# Patient Record
Sex: FEMALE
[De-identification: names, ages, dates, MRNs, and addresses within clinical notes are randomized per-mention and may not be internally consistent; named-entity substitution may affect disease eponyms.]

---

## 2019-09-26 ENCOUNTER — APPOINTMENT (OUTPATIENT)
Dept: ULTRASOUND IMAGING | Facility: CLINIC | Age: 59
End: 2019-09-26
Payer: COMMERCIAL

## 2019-09-26 PROCEDURE — 76705 ECHO EXAM OF ABDOMEN: CPT

## 2020-12-08 PROBLEM — Z00.00 ENCOUNTER FOR PREVENTIVE HEALTH EXAMINATION: Status: ACTIVE | Noted: 2020-12-08

## 2021-03-08 ENCOUNTER — APPOINTMENT (OUTPATIENT)
Dept: OBGYN | Facility: CLINIC | Age: 61
End: 2021-03-08
Payer: COMMERCIAL

## 2021-03-08 VITALS
BODY MASS INDEX: 28.25 KG/M2 | HEIGHT: 67 IN | TEMPERATURE: 97.9 F | WEIGHT: 180 LBS | DIASTOLIC BLOOD PRESSURE: 78 MMHG | SYSTOLIC BLOOD PRESSURE: 116 MMHG

## 2021-03-08 DIAGNOSIS — Z12.39 ENCOUNTER FOR OTHER SCREENING FOR MALIGNANT NEOPLASM OF BREAST: ICD-10-CM

## 2021-03-08 DIAGNOSIS — L90.0 LICHEN SCLEROSUS ET ATROPHICUS: ICD-10-CM

## 2021-03-08 DIAGNOSIS — Z12.4 ENCOUNTER FOR SCREENING FOR MALIGNANT NEOPLASM OF CERVIX: ICD-10-CM

## 2021-03-08 DIAGNOSIS — N95.2 POSTMENOPAUSAL ATROPHIC VAGINITIS: ICD-10-CM

## 2021-03-08 DIAGNOSIS — Z11.51 ENCOUNTER FOR SCREENING FOR HUMAN PAPILLOMAVIRUS (HPV): ICD-10-CM

## 2021-03-08 DIAGNOSIS — Z01.419 ENCOUNTER FOR GYNECOLOGICAL EXAMINATION (GENERAL) (ROUTINE) W/OUT ABNORMAL FINDINGS: ICD-10-CM

## 2021-03-08 PROCEDURE — 99072 ADDL SUPL MATRL&STAF TM PHE: CPT

## 2021-03-08 PROCEDURE — 99386 PREV VISIT NEW AGE 40-64: CPT

## 2021-03-08 RX ORDER — ESTRADIOL 0.1 MG/G
0.1 CREAM VAGINAL
Qty: 1 | Refills: 3 | Status: ACTIVE | COMMUNITY
Start: 2021-03-08 | End: 1900-01-01

## 2021-03-09 PROBLEM — L90.0 LICHEN SCLEROSUS: Status: ACTIVE | Noted: 2021-03-09

## 2021-03-09 PROBLEM — N95.2 POST-MENOPAUSE ATROPHIC VAGINITIS: Status: ACTIVE | Noted: 2021-03-08

## 2021-03-09 NOTE — PHYSICAL EXAM
[Appropriately responsive] : appropriately responsive [Alert] : alert [No Acute Distress] : no acute distress [Soft] : soft [Non-tender] : non-tender [No Lesions] : no lesions [No Mass] : no mass [Oriented x3] : oriented x3 [Examination Of The Breasts] : a normal appearance [No Masses] : no breast masses were palpable [Atrophy] : atrophy [No Bleeding] : There was no active vaginal bleeding [Normal] : normal [Uterine Adnexae] : normal [FreeTextEntry3] : mobile thyroid, no masses, no nodules [FreeTextEntry6] : No cervical or axillary lymphadenopathy. [FreeTextEntry1] : Lichen sclerosis symmetrically of labia minora with excoriation and scarring of the clitoris.

## 2021-03-09 NOTE — DISCUSSION/SUMMARY
[FreeTextEntry1] : 59 y/o para 2\par gyn annual\par -pap w/ hpv\par -MMG and breast US script\par -con't care w/ pcp\par \par lichen sclerosis\par vaginal atrophy causing dyspareunia\par -recommend estrace 2 gm daily for two weeks applied vaginally and vulvar; then Twice weekly\par -discussed r/b/a\par -patient may return to using clobetasol Twice weekly for 2 weeks on alternative days\par -vulvar care discussed\par -discussed repeat biopsy if sx fail to resolve or improve\par \par rto 3 mos f/u vulvar exam

## 2021-03-09 NOTE — HISTORY OF PRESENT ILLNESS
[Patient reported mammogram was normal] : Patient reported mammogram was normal [N] : Patient is not sexually active [Y] : Positive pregnancy history [Menarche Age: ____] : age at menarche was [unfilled] [Currently In Menopause] : currently in menopause [Previously active] : previously active [No] : No [Patient refuses STI testing] : Patient refuses STI testing [Patient reported breast sonogram was normal] : Patient reported breast sonogram was normal [Patient reported PAP Smear was normal] : Patient reported PAP Smear was normal [Patient reported bone density results were normal] : Patient reported bone density results were normal [Mammogramdate] : 03/2020 [TextBox_19] : normal per pt [BreastSonogramDate] : 03/2020 [TextBox_25] : normal per pt [PapSmeardate] : 03/2020 [TextBox_31] : normal per pt [BoneDensityDate] : 2 years ago [LMPDate] : 10 years go [PGHxTotal] : 2 [BannerxFullTerm] : 2 [Page HospitalxLiving] : 2 [FreeTextEntry1] : 10 years ago

## 2021-03-12 LAB
CYTOLOGY CVX/VAG DOC THIN PREP: ABNORMAL
HPV HIGH+LOW RISK DNA PNL CVX: NOT DETECTED

## 2021-04-16 ENCOUNTER — NON-APPOINTMENT (OUTPATIENT)
Age: 61
End: 2021-04-16

## 2021-04-22 DIAGNOSIS — N60.01 SOLITARY CYST OF RIGHT BREAST: ICD-10-CM

## 2024-07-11 ENCOUNTER — NON-APPOINTMENT (OUTPATIENT)
Age: 64
End: 2024-07-11

## 2024-10-10 ENCOUNTER — TELEPHONE (OUTPATIENT)
Dept: DERMATOLOGY | Facility: CLINIC | Age: 64
End: 2024-10-10

## 2024-10-10 NOTE — TELEPHONE ENCOUNTER
Caller would like to discuss appointment     Patient: June Bryant    Callback Number: [phone]    Best Availability: anytime    Additional Info: Pt called stating she would like to schedule a new patient appt in derm. Pt was notified of how far out we are booking at this time.    I advised caller of a callback by 24 hours.

## 2024-10-21 NOTE — TELEPHONE ENCOUNTER
Called and spoke to patient, per patient scheduling with [facility] took to long and patient has requested services elsewhere.

## 2024-12-29 ENCOUNTER — HOSPITAL ENCOUNTER (EMERGENCY)
Facility: HOSPITAL | Age: 64
Discharge: HOME | End: 2024-12-29
Attending: EMERGENCY MEDICINE
Payer: COMMERCIAL

## 2024-12-29 ENCOUNTER — APPOINTMENT (OUTPATIENT)
Dept: RADIOLOGY | Facility: HOSPITAL | Age: 64
End: 2024-12-29
Payer: COMMERCIAL

## 2024-12-29 VITALS
WEIGHT: 181 LBS | TEMPERATURE: 97.1 F | HEART RATE: 60 BPM | HEIGHT: 68 IN | RESPIRATION RATE: 20 BRPM | OXYGEN SATURATION: 91 % | DIASTOLIC BLOOD PRESSURE: 47 MMHG | SYSTOLIC BLOOD PRESSURE: 109 MMHG | BODY MASS INDEX: 27.43 KG/M2

## 2024-12-29 DIAGNOSIS — R00.2 PALPITATIONS: Primary | ICD-10-CM

## 2024-12-29 LAB
ALBUMIN SERPL-MCNC: 4.3 G/DL (ref 3.5–5.3)
ALP SERPL-CCNC: 74 U/L (ref 50–130)
ALT SERPL-CCNC: 21 U/L (ref 7–52)
ANION GAP SERPL CALC-SCNC: 6 MMOL/L (ref 3–11)
AST SERPL-CCNC: 24 U/L
BASOPHILS # BLD AUTO: 0 10*3/UL
BASOPHILS NFR BLD AUTO: 0.4 % (ref 0–2)
BILIRUB SERPL-MCNC: 0.41 MG/DL (ref 0.2–1.4)
BUN SERPL-MCNC: 18 MG/DL (ref 7–25)
CALCIUM ALBUM COR SERPL-MCNC: 9.4 MG/DL (ref 8.6–10.3)
CALCIUM SERPL-MCNC: 9.6 MG/DL (ref 8.6–10.3)
CHLORIDE SERPL-SCNC: 105 MMOL/L (ref 98–107)
CO2 SERPL-SCNC: 27 MMOL/L (ref 21–32)
CREAT SERPL-MCNC: 1.1 MG/DL (ref 0.6–1.1)
EGFRCR SERPLBLD CKD-EPI 2021: 56 ML/MIN/1.73M*2
EOSINOPHIL # BLD AUTO: 0.2 10*3/UL
EOSINOPHIL NFR BLD AUTO: 2.6 % (ref 0–3)
ERYTHROCYTE [DISTWIDTH] IN BLOOD BY AUTOMATED COUNT: 13.5 % (ref 11.5–14)
GLUCOSE SERPL-MCNC: 90 MG/DL (ref 70–105)
HCT VFR BLD AUTO: 41.8 % (ref 34–45)
HGB BLD-MCNC: 14 G/DL (ref 11.5–15.5)
LYMPHOCYTES # BLD AUTO: 1 10*3/UL
LYMPHOCYTES NFR BLD AUTO: 16.6 % (ref 11–47)
MCH RBC QN AUTO: 27.7 PG (ref 28–33)
MCHC RBC AUTO-ENTMCNC: 33.4 G/DL (ref 32–36)
MCV RBC AUTO: 82.9 FL (ref 81–97)
MONOCYTES # BLD AUTO: 0.5 10*3/UL
MONOCYTES NFR BLD AUTO: 7.8 % (ref 3–11)
NEUTROPHILS # BLD AUTO: 4.4 10*3/UL
NEUTROPHILS NFR BLD AUTO: 72.6 % (ref 41–81)
PLATELET # BLD AUTO: 291 10*3/UL (ref 140–350)
PMV BLD AUTO: 7.5 FL (ref 6.9–10.8)
POTASSIUM SERPL-SCNC: 4.1 MMOL/L (ref 3.5–5.1)
PROT SERPL-MCNC: 7.1 G/DL (ref 6–8.3)
RBC # BLD AUTO: 5.05 10*6/UL (ref 3.7–5.3)
SODIUM SERPL-SCNC: 138 MMOL/L (ref 135–145)
TROPONIN I SERPL-MCNC: 2.8 PG/ML
WBC # BLD AUTO: 6 10*3/UL (ref 4.5–10.5)

## 2024-12-29 PROCEDURE — 36415 COLL VENOUS BLD VENIPUNCTURE: CPT | Performed by: EMERGENCY MEDICINE

## 2024-12-29 PROCEDURE — 99284 EMERGENCY DEPT VISIT MOD MDM: CPT | Performed by: EMERGENCY MEDICINE

## 2024-12-29 PROCEDURE — 84484 ASSAY OF TROPONIN QUANT: CPT | Performed by: EMERGENCY MEDICINE

## 2024-12-29 PROCEDURE — 71045 X-RAY EXAM CHEST 1 VIEW: CPT

## 2024-12-29 PROCEDURE — 85025 COMPLETE CBC W/AUTO DIFF WBC: CPT | Performed by: EMERGENCY MEDICINE

## 2024-12-29 PROCEDURE — 93005 ELECTROCARDIOGRAM TRACING: CPT

## 2024-12-29 PROCEDURE — 80053 COMPREHEN METABOLIC PANEL: CPT | Performed by: EMERGENCY MEDICINE

## 2024-12-30 NOTE — DISCHARGE INSTRUCTIONS
Continue all medications.  Follow-up with your primary physician and cardiology.  Return if you have worsening or new concerning symptoms.

## 2024-12-30 NOTE — ED PROVIDER NOTES
Chief Complaint   Patient presents with    Irregular Heart Beat     Pt walked in triage. C/p irregular heart beat. Mhx POT.           HPI:    Patient is a 64 y.o. female who presents with tachycardia.  She states that today around 1500 she had the onset of the sensation that her heart was racing.  She checked her heart rate with a monitor and her heart rate was in the 110s.  She felt mildly short of breath and had upper back pain during this time.  Back pain was alleviated after a massage by her .  She has been worked up previously and [facility] by cardiology and is on propranolol.  She took an extra dose of propranolol and her tachycardia has resolved.  She is asymptomatic on arrival to the emergency department.  She denies fever, cough, chest pain, or abdominal pain.  She denies any other medical history.        No past medical history on file.    No past surgical history on file.    Social History     Socioeconomic History    Marital status:      Spouse name: Not on file    Number of children: Not on file    Years of education: Not on file    Highest education level: Not on file   Occupational History    Not on file   Tobacco Use    Smoking status: Not on file    Smokeless tobacco: Not on file   Substance and Sexual Activity    Alcohol use: Not on file    Drug use: Not on file    Sexual activity: Not on file   Other Topics Concern    Not on file   Social History Narrative    Not on file     Social Drivers of Health     Financial Resource Strain: Not on file   Food Insecurity: Not on file   Transportation Needs: Not on file   Physical Activity: Not on file   Stress: Not on file   Social Connections: Not on file   Intimate Partner Violence: Not on file   Housing Stability: Not on file       No family history on file.    No Known Allergies    No current outpatient medications on file.      ROS:  As per HPI.      ED Triage Vitals   Temp Heart Rate Resp BP SpO2   12/29/24 1550 12/29/24 1547 12/29/24  1550 12/29/24 1550 12/29/24 1547   36.2 °C (97.1 °F) 123 18 134/82 97 %      Mean BP (mmHg) Temp Source Heart Rate Source Patient Position BP Location   12/29/24 1550 12/29/24 1550 -- -- --   101 Oral         FiO2 (%)       --                    Physical Exam:  Nursing note and vitals reviewed.  Constitutional: appears well-developed.   Head: Normocephalic and atraumatic.   Eyes: Pupils are equal, round, and reactive to light.   Neck: Supple  Cardiovascular: Regular rate and rhythm.  Normal pulses.  Pulmonary/Chest: No respiratory distress.  Clear to auscultation bilaterally.  Abdominal: Soft and nontender.    Musculoskeletal: No edema  Neurological: Alert.   Skin: Skin is warm and dry.   Psychiatric: Normal mood and affect.           Labs:  Labs Reviewed   CBC WITH AUTO DIFFERENTIAL - Abnormal       Result Value    WBC 6.0      RBC 5.05      Hemoglobin 14.0      Hematocrit 41.8      MCV 82.9      MCH 27.7 (*)     MCHC 33.4      RDW 13.5      Platelets 291      MPV 7.5      Neutrophils% 72.6      Lymphocytes% 16.6      Monocytes% 7.8      Eosinophils% 2.6      Basophils% 0.4      ANC (auto diff) 4.40      Lymphocytes Absolute 1.00      Monocytes Absolute 0.50      Eosinophils Absolute 0.20      Basophils Absolute 0.00     COMPREHENSIVE METABOLIC PANEL - Abnormal    Sodium 138      Potassium 4.1      Chloride 105      CO2 27      Anion Gap 6      BUN 18      Creatinine 1.10      Glucose 90      Calcium 9.6      AST 24      ALT (SGPT) 21      Alkaline Phosphatase 74      Total Protein 7.1      Albumin 4.3      Total Bilirubin 0.41      Corrected Calcium 9.4      eGFR 56 (*)     Narrative:     Calculation based on the 2021 Chronic Kidney Disease Epidemiology Collaboration (CKD-EPI) equation refit without adjustment for race.   HIGH SENSITIVE TROPONIN I - Normal    hsTnI 0 Hour 2.8     LAVENDER TOP RAINBOW         Imaging:  XR chest portable 1 view   Final Result   IMPRESSION:   No acute disease.                 MDM:    Available records were reviewed.  Labs obtained and show no evidence of anemia, renal insufficiency, acidosis, or significant electrolyte abnormality.  Troponin is negative.  EKG shows no evidence of ischemia.  Doubt ACS.  Chest x-ray obtained and reviewed and shows no evidence of pneumonia or pneumothorax.  She was observed in the emergency department on telemetry and remains in sinus rhythm.  She is asymptomatic in the emergency department.  Results were discussed with the patient.  She is comfortable with discharge and outpatient follow-up.  Symptomatic and return instructions were discussed.          Procedures  EKG interpretation: Sinus rhythm, rate 64, normal intervals, no acute ST changes      Clinical Impression:    Palpitations  Back pain        A voice recognition program was used to aid in documentation of this record.  Sometimes words are not printed exactly as they were spoken.  While efforts were made to carefully edit and correct any inaccuracies, some areas may be present; please take these into context.  Please contact the provider if areas are identified.       Adriano Martines MD  12/30/24 5655

## 2025-04-08 ENCOUNTER — NON-APPOINTMENT (OUTPATIENT)
Age: 65
End: 2025-04-08